# Patient Record
Sex: MALE | Race: WHITE | NOT HISPANIC OR LATINO | URBAN - METROPOLITAN AREA
[De-identification: names, ages, dates, MRNs, and addresses within clinical notes are randomized per-mention and may not be internally consistent; named-entity substitution may affect disease eponyms.]

---
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
## 2019-08-14 ENCOUNTER — COMMUNICATION - HEALTHEAST (OUTPATIENT)
Dept: FAMILY MEDICINE | Facility: CLINIC | Age: 19
End: 2019-08-14

## 2021-05-30 VITALS — WEIGHT: 177.1 LBS

## 2021-05-31 VITALS — BODY MASS INDEX: 22.25 KG/M2 | WEIGHT: 173.4 LBS | HEIGHT: 74 IN

## 2021-06-01 VITALS — BODY MASS INDEX: 21.62 KG/M2 | WEIGHT: 167.5 LBS

## 2021-06-01 VITALS — BODY MASS INDEX: 21.36 KG/M2 | WEIGHT: 165.5 LBS

## 2021-06-09 NOTE — PROGRESS NOTES
Chief complaint: Persistent cough and sinus pressure    HPI: The patient is here with his father with a 5 day history of sore throat sinus congestion and cough that is getting a little bit worse.  He had a frontal headache behind his eyes when this all started the cough is not really productive it feels as though it is just in his throat.  They have tried Mucinex, over-the-counter cough medicine, Ricola cough lozenges, tea, chicken soup, Stephani-[place] cold and cough, zinc lozenges.  He has had a couple of sinus infections in the past but has never had a Flonase nasal spray.  He is in high school and he is running track but not really very actively this week.  He has no known exposures to strep or influenza or anything else specific.  He has had no fever no vomiting no nausea he is not around cigarette smoke and he does never had any asthma or reactive airways.  They have not tried the pseudoephedrine    Objective:/80 (Patient Site: Right Arm, Patient Position: Sitting, Cuff Size: Adult Regular)  Pulse 64  Temp 98.3  F (36.8  C) (Oral)   Wt 177 lb 1.6 oz (80.3 kg)  He is in no acute distress he is wearing glasses.  His conjunctiva are clear and his TMs are pearly gray.  He does not have a lot of facial tenderness at this point.  His nasal mucosa are pretty boggy and swollen.  He does not have posterior pharyngeal drainage and his neck is supple without lymphadenopathy.  I can hear a loose cough but his lungs are absolutely clear to auscultation even to forced expiration.    Assessment: Bronchitis with sinus congestion    Plan: We will have him try over-the-counter Afrin followed by Flonase.  The Afrin for 3 days only in the Flonase up to 2 weeks.  Salt water gargles saline nasal irrigation and I will do Tessalon Perles 3 times a day for the cough suppression do not think he needs an antibiotic and they were comfortable with that plan

## 2021-06-16 PROBLEM — F41.1 GAD (GENERALIZED ANXIETY DISORDER): Status: ACTIVE | Noted: 2018-04-30

## 2021-06-16 PROBLEM — F33.1 MODERATE EPISODE OF RECURRENT MAJOR DEPRESSIVE DISORDER (H): Status: ACTIVE | Noted: 2018-04-30

## 2021-06-17 NOTE — PROGRESS NOTES
Assessment/Plan:        Diagnoses and all orders for this visit:    Knee pain  -     Ambulatory referral to Orthopedic Surgery    Ankle pain  -     Ambulatory referral to Orthopedic Surgery    Hair thinning    Depression    Other orders  -     sertraline (ZOLOFT) 50 MG tablet; Take half a tablet orally daily for 6 days then increase to 1 tablet daily thereafter  Dispense: 30 tablet; Refill: 0        For his knee and ankle pain, will hold off on imaging.  Recommend physical therapy and he would like to do this with [facility].  Will put in a referral.  For his hair thinning, he was not fully concerned compared to his mom.  He would like to hold off on lab testing at this time.  We discussed about TSH, ferritin, PRETTY, CBC.  He was started on Rogaine and has been on it for 2 weeks.  Has a follow-up with Dr. Mckeon in June.  He would like to continue with the Rogaine and determine if labs are needed after treatment especially if no response noted.  For his depression, concerns with ongoing and worsening of symptoms.  Consider psychotherapy and he will check with their insurance for coverage.  We will also start him on sertraline.  Discussed medication and side effects, duration of use.  To revisit with Dr. Walden in 4 weeks, earlier if symptoms worsen especially of suicidal ideation in place.  He was agreeable with the plans.  Subjective:    Patient ID: Nick Andrews is a 17 y.o. male.    ROXI Romero is here with concerns about knee and ankle pain, hair thinning.  Here with mom.  When mom stepped out of the room for physical exam, he also mentions about issues with depression, anxiety.  He likes to run and has been doing this for a number of years.  He started having pain in his left knee and ankle around October 2017.  Denies any trauma, injury.  If he tries to rest, pain would improve.  He has not been able to get back to his usual regimen.  It could be 6-7/10 especially with stairs or running but now  around 2-3/10 as he has not been running much.  He denies any surgery, fractures in these joints before.  He tried to have it evaluated with his  and was given exercises.  Was advised to have physical therapy and would like to have this done at [facility].    Mom notes thinning of his hair around a month ago.  He denies any significant hair fall.  No new skin products.  He tries use shampoo and conditioner almost every day.  No focal bald spots.  No family history that they are aware off.  No changes in his diet.  Tried a new multivitamin that they ordered online but he has already stopped taking it.  Does not use color or dyes.  Seen by Dr. Mckeon and given Rogaine.  Also follows with them for his acne and on Accutane.    He mentions of issues with depression, anxiety.  Wanted to discuss this privately and done when mom was out of the room.  He has been dealing with these for a number of years.  Seems to be worse in the past 1 or 2 months.  Could not pinpoint a trigger but mostly with stressful situations such as when he has finals in school.  He describes it as easily affected by small issues such as if he was not able to do things.  He gets upset and affected.  Has good and bad days.  Seems to be more consistent with bad days and worse.  Has days where he has suicidal thoughts but no actual plans in place.  Thoughts of not waking up the next day.  Has times where it has affected his studies and feels some grades.  Doing a bit better with this at this time.  He tries to isolate himself when he has these events or push away.  His mom is not aware what he has told his dad recently and his dad made this appointment.  He thinks that maternal grandmother and mom has issues with mental health but unsure of diagnosis.  Will be starting college in a few months but has not decided on where he will be.  Has not connected with therapist.  Has stooled some of his friends who has been supportive.    Review  of Systems  As above otherwise negative.          Objective:    Physical Exam  /50 (Patient Site: Left Arm, Patient Position: Sitting, Cuff Size: Adult Regular)  Pulse 76  Wt 165 lb 8 oz (75.1 kg)  BMI 21.36 kg/m2    Vital signs noted above. AAO ×3.  HEENT no nasal discharge, moist oral mucosa.  Scalp: Slight thinning of his hair on his occipital region but no focal bald spots noted.  No rashes in his scalp.  Neck: Supple neck, nonpalpable cervical lymph nodes.  Lungs: Clear to auscultation bilateral.  Heart: S1-S2 regular rate and rhythm, no murmurs were noted.  Abdomen:  with bowel sounds.  Extremities: No edema, pulses were full and equal.  Minimal swelling in his left medial knee.  No erythema, warmth.  Negative anterior drawer sign.  No laxity.  No limitation or pain with range of motion.

## 2021-06-17 NOTE — PROGRESS NOTES
Assessment:   1. MONSE (generalized anxiety disorder) MONSE-7 score of 12  2. Moderate episode of recurrent major depressive disorder. PHQ-9 score 16  Patient's mother was not in supportive of pharmacotherapy for anxiety and depression.  She was insisting the patient should go for therapy instead of taking medications but patient wants to try medication while starting counseling. We agreed to switch to a different mediation. Instruction was given to stop sertraline and start escitalopram.  - escitalopram oxalate (LEXAPRO) 10 MG tablet; Take 1 tablet (10 mg total) by mouth daily.  Dispense: 30 tablet; Refill: 0  - Ambulatory referral to Psychology     Plan:   Medications: Lexapro.  Recommended counseling.  Handouts describing disease, natural history, and treatment were not given to the patient.  Reviewed concept of anxiety as biochemical imbalance of neurotransmitters and rationale for treatment.  Instructed patient to contact office or on-call physician promptly should condition worsen or any new symptoms appear and provided on-call telephone numbers. IF THE PATIENT HAS ANY SUICIDAL OR HOMICIDAL IDEATIONS, CALL THE OFFICE, DISCUSS WITH A SUPPORT MEMBER, OR GO TO THE ER IMMEDIATELY. Patient was agreeable with this plan.  Follow up: 2 weeks.  Spent 45 minutes (>50% of visit) discussing the risks of anxiety disorder and and depression, the  pathophysiology, etiology, risks, and principles of treatment.     Subjective:   Nick Andrews is a 17 y.o. male who presents for follow up of anxiety disorder, he was accompanied by his mother and father.  Patient was seen by Dr. bhatti at the [facility] couple of weeks ago.  Patient was diagnosed with anxiety and depression and was started on sertraline 50 mg daily.  Patient status slowly with 25 mg and after a week he went up to 50 mg.  Patient reported that since she went up to 50 mg he has had more anxiety and he is easily irritated.  Patient's mother stated that she was  not aware the patient was started on antidepressant and she was not informed during the visit that she noticed that when patient came out of the visit that he had prescription for antianxiety.  Patient's mother stated that she is against antianxiety antidepressant medications.  When I asked patient if it was with his consent that he was put on anti-depressant he said yes that he wanted to start medication because of his anxieties. Current symptoms: Little interest or pleasure in doing things, feeling down depressed or hopeless, trouble falling asleep or staying asleep, feeling tired or having little energy, feeling bad about himself or that he is a failure, trouble concentrating on things such as reading and watching TV and thoughts that he will be rather dead or hurt himself only every day. He denies current suicidal and homicidal ideation. He complains of the following side effects from the treatment: Increased anxiety and irritability.    The following portions of the patient's history were reviewed and updated as appropriate: allergies, current medications, past family history, past medical history, past social history, past surgical history and problem list.      Objective:      /80 (Patient Site: Left Arm, Patient Position: Sitting, Cuff Size: Adult Regular)  Pulse 64  Wt 167 lb 8 oz (76 kg)  BMI 21.62 kg/m2   General:  alert, appears stated age and cooperative   Affect/Behavior:  full facial expressions, good grooming, good insight, normal perception, normal reasoning, normal speech pattern and content and normal thought patterns

## 2021-06-19 NOTE — LETTER
Letter by Shiv Walden MD at      Author: Shiv Walden MD Service: -- Author Type: --    Filed:  Encounter Date: 8/14/2019 Status: (Other)         Nick Andrews  [address] [id number]             August 14, 2019         Dear Mr. Andrews,    I just wanted to send a friendly reminder that you are due for your annual depression follow up.  These appointments need to be done yearly and make sure there are not interruptions with medication refills as well.  Please use my chart or call the clinic at [phone] and schedule an appointment with your provider.    Please call with questions or contact us using [employer].    Sincerely,        Electronically signed by Shiv Walden MD